# Patient Record
Sex: FEMALE | Race: WHITE | NOT HISPANIC OR LATINO
[De-identification: names, ages, dates, MRNs, and addresses within clinical notes are randomized per-mention and may not be internally consistent; named-entity substitution may affect disease eponyms.]

---

## 2017-06-03 ENCOUNTER — CHARTING TRANS (OUTPATIENT)
Dept: OTHER | Age: 22
End: 2017-06-03

## 2017-06-13 ENCOUNTER — OFFICE VISIT (OUTPATIENT)
Dept: AUDIOLOGY | Facility: CLINIC | Age: 22
End: 2017-06-13

## 2017-06-13 ENCOUNTER — OFFICE VISIT (OUTPATIENT)
Dept: OTOLARYNGOLOGY | Facility: CLINIC | Age: 22
End: 2017-06-13

## 2017-06-13 VITALS
DIASTOLIC BLOOD PRESSURE: 56 MMHG | SYSTOLIC BLOOD PRESSURE: 114 MMHG | RESPIRATION RATE: 16 BRPM | HEART RATE: 60 BPM | HEIGHT: 65 IN | TEMPERATURE: 99 F | BODY MASS INDEX: 23.32 KG/M2 | WEIGHT: 140 LBS

## 2017-06-13 DIAGNOSIS — T70.0XXA EAR BAROTRAUMA, INITIAL ENCOUNTER: Primary | ICD-10-CM

## 2017-06-13 DIAGNOSIS — H92.02 EAR PAIN, LEFT: Primary | ICD-10-CM

## 2017-06-13 DIAGNOSIS — H90.0 CONDUCTIVE HEARING LOSS, BILATERAL: ICD-10-CM

## 2017-06-13 PROCEDURE — 99243 OFF/OP CNSLTJ NEW/EST LOW 30: CPT | Performed by: OTOLARYNGOLOGY

## 2017-06-13 PROCEDURE — 99212 OFFICE O/P EST SF 10 MIN: CPT | Performed by: OTOLARYNGOLOGY

## 2017-06-13 PROCEDURE — 92567 TYMPANOMETRY: CPT | Performed by: AUDIOLOGIST

## 2017-06-13 PROCEDURE — 92557 COMPREHENSIVE HEARING TEST: CPT | Performed by: AUDIOLOGIST

## 2017-06-13 RX ORDER — METHYLPREDNISOLONE 4 MG/1
TABLET ORAL
Qty: 1 PACKAGE | Refills: 0 | Status: SHIPPED | OUTPATIENT
Start: 2017-06-13 | End: 2017-06-20 | Stop reason: ALTCHOICE

## 2017-06-13 RX ORDER — AMOXICILLIN 875 MG/1
TABLET, COATED ORAL
Refills: 0 | COMMUNITY
Start: 2017-06-03 | End: 2017-06-13

## 2017-06-14 NOTE — PROGRESS NOTES
AUDIOGRAM     Sheri Lopez was referred for testing by Denise Vanegas for left ear problem following scuba diving. 9/7/1995  [id number]      Otoscopic inspection: right ear no cerumen; left ear no cerumen.        Tests/Procedures  Patient was t

## 2017-06-14 NOTE — PROGRESS NOTES
Reese Armstrong is a 24year old female. Patient presents with:  Ear Problem: Patient went scuba diving in [place] 2 weeks ago and went [facility] was diagnosed with ear infection.  Pt was prescribed Amoxicillin 875 taking only 6 out of 10 day to time, place, person & situation. Appropriate mood and affect. Lymph Detail Normal Submental. Submandibular. Anterior cervical. Posterior cervical. Supraclavicular.    Eyes Normal Conjunctiva - Right: Normal, Left: Normal. Pupil - Right: Normal, Left: N

## 2017-06-20 ENCOUNTER — OFFICE VISIT (OUTPATIENT)
Dept: OTOLARYNGOLOGY | Facility: CLINIC | Age: 22
End: 2017-06-20

## 2017-06-20 VITALS
WEIGHT: 155 LBS | HEIGHT: 65 IN | BODY MASS INDEX: 25.83 KG/M2 | SYSTOLIC BLOOD PRESSURE: 98 MMHG | DIASTOLIC BLOOD PRESSURE: 60 MMHG

## 2017-06-20 DIAGNOSIS — H92.02 EAR PAIN, LEFT: Primary | ICD-10-CM

## 2017-06-20 PROCEDURE — 99213 OFFICE O/P EST LOW 20 MIN: CPT | Performed by: OTOLARYNGOLOGY

## 2017-06-20 PROCEDURE — 99212 OFFICE O/P EST SF 10 MIN: CPT | Performed by: OTOLARYNGOLOGY

## 2017-06-20 NOTE — PROGRESS NOTES
Brian Cordova is a 24year old female. Patient presents with: Follow - Up: left ear infection,patient states she is feeling much better    HPI:   She is doing much better following her barotrauma episode in her left ear. the pain has resolved and tympanic membranes     ASSESSMENT AND PLAN:   1. Ear pain, left  Her ear has returned to normal.She still feels that her hearing is slightly diminished in her left ear. I recommended just observation for for now.  I did discuss precautions for her while she

## 2018-09-04 ENCOUNTER — LAB SERVICES (OUTPATIENT)
Dept: OTHER | Age: 23
End: 2018-09-04

## 2018-09-04 ENCOUNTER — CHARTING TRANS (OUTPATIENT)
Dept: OTHER | Age: 23
End: 2018-09-04

## 2018-09-04 LAB
APPEARANCE: NORMAL
BILIRUBIN: NORMAL
COLOR: YELLOW
GLUCOSE U: NORMAL
KETONES: NORMAL
LEUKOCYTES: NORMAL
NITRITE: NEGATIVE
OCCULT BLOOD: NORMAL
PH: 6
PROTEIN: NORMAL
URINE SPEC GRAVITY: 1.02
UROBILINOGEN: NORMAL

## 2018-09-06 ENCOUNTER — CHARTING TRANS (OUTPATIENT)
Dept: OTHER | Age: 23
End: 2018-09-06

## 2018-09-06 LAB — BACTERIA UR CULT: NORMAL

## 2018-09-25 ENCOUNTER — NURSE TRIAGE (OUTPATIENT)
Dept: OTHER | Age: 23
End: 2018-09-25

## 2018-09-25 ENCOUNTER — OFFICE VISIT (OUTPATIENT)
Dept: INTERNAL MEDICINE CLINIC | Facility: CLINIC | Age: 23
End: 2018-09-25
Payer: COMMERCIAL

## 2018-09-25 VITALS
HEART RATE: 81 BPM | BODY MASS INDEX: 26 KG/M2 | SYSTOLIC BLOOD PRESSURE: 120 MMHG | DIASTOLIC BLOOD PRESSURE: 83 MMHG | WEIGHT: 158 LBS | TEMPERATURE: 100 F

## 2018-09-25 DIAGNOSIS — R30.0 DYSURIA: Primary | ICD-10-CM

## 2018-09-25 DIAGNOSIS — R39.15 URGENCY OF URINATION: ICD-10-CM

## 2018-09-25 LAB
APPEARANCE: NEGATIVE
BILIRUBIN: NEGATIVE
GLUCOSE (URINE DIPSTICK): NEGATIVE MG/DL
KETONES (URINE DIPSTICK): NEGATIVE MG/DL
MULTISTIX LOT#: NORMAL NUMERIC
PH, URINE: 6 (ref 4.5–8)
PROTEIN (URINE DIPSTICK): NEGATIVE MG/DL
SPECIFIC GRAVITY: <=1.005 (ref 1–1.03)
UROBILINOGEN,SEMI-QN: 0.2 MG/DL (ref 0–1.9)

## 2018-09-25 PROCEDURE — 81003 URINALYSIS AUTO W/O SCOPE: CPT | Performed by: INTERNAL MEDICINE

## 2018-09-25 PROCEDURE — 99212 OFFICE O/P EST SF 10 MIN: CPT | Performed by: INTERNAL MEDICINE

## 2018-09-25 PROCEDURE — 99214 OFFICE O/P EST MOD 30 MIN: CPT | Performed by: INTERNAL MEDICINE

## 2018-09-25 RX ORDER — CEPHALEXIN 500 MG/1
500 CAPSULE ORAL 2 TIMES DAILY
Qty: 14 CAPSULE | Refills: 0 | Status: SHIPPED | OUTPATIENT
Start: 2018-09-25 | End: 2018-11-27

## 2018-09-25 NOTE — TELEPHONE ENCOUNTER
Action Requested: Summary for Provider     []  Critical Lab, Recommendations Needed  [] Need Additional Advice  [x]   FYI    []   Need Orders  [] Need Medications Sent to Pharmacy  []  Other     SUMMARY: has had frequent urinary tract infections since June

## 2018-09-25 NOTE — PROGRESS NOTES
HPI:    Patient ID: Fabricio Magallon is a 24year old female. Urinary   This is a new problem. The current episode started yesterday. The problem occurs intermittently. The problem has been unchanged.  Associated symptoms include a fever, nausea, u at McBurney's point and negative Jaramillo's sign. Musculoskeletal: She exhibits no edema. Lymphadenopathy:     She has no cervical adenopathy. Neurological: She is alert. Skin: No rash noted. She is not diaphoretic.               ASSESSMENT/PLAN:   (R

## 2018-09-27 ENCOUNTER — TELEPHONE (OUTPATIENT)
Dept: INTERNAL MEDICINE CLINIC | Facility: CLINIC | Age: 23
End: 2018-09-27

## 2018-09-27 NOTE — TELEPHONE ENCOUNTER
Pt returned call and was informed of results and recommendations as per EL's message below. Pt states will see her mother's urologist instead. Wants to verify should stop cephalexin that was prescribed on 9/25/18?     Please reply to bianca: LOTTIE Jean

## 2018-09-27 NOTE — TELEPHONE ENCOUNTER
Pls call pt; her urine culture showed only contaminated sample and essentially this is negative urine culture. I would suggest having pt to see urologist DR Sera Pablo due to her recurrent urinary symptoms for the past 3mos.

## 2018-10-25 ENCOUNTER — TELEPHONE (OUTPATIENT)
Dept: SURGERY | Facility: CLINIC | Age: 23
End: 2018-10-25

## 2018-10-25 NOTE — TELEPHONE ENCOUNTER
Can offer consult w/ Dr. Marina Marquez on Thursday 12/6 @ 9:00 am (use 40 min for consult). If declines, you can offer next available consult with other 2 Urologists, then place patient on wait list. Thank you.

## 2018-10-25 NOTE — TELEPHONE ENCOUNTER
Pts mother requesting appt with PVK sooner than next available, states pt has been having a lot of pain in the bladder area, states she has had 2 serious UTI's. Pt is moving out of the country in a month. Pts mother declined appt with Ankit Pacheco.  Pts mother sta

## 2018-11-03 VITALS
RESPIRATION RATE: 16 BRPM | HEIGHT: 64 IN | SYSTOLIC BLOOD PRESSURE: 118 MMHG | HEART RATE: 80 BPM | BODY MASS INDEX: 23.9 KG/M2 | TEMPERATURE: 98.2 F | DIASTOLIC BLOOD PRESSURE: 70 MMHG | WEIGHT: 140 LBS

## 2018-11-26 ENCOUNTER — TELEPHONE (OUTPATIENT)
Dept: SURGERY | Facility: CLINIC | Age: 23
End: 2018-11-26

## 2018-11-27 ENCOUNTER — OFFICE VISIT (OUTPATIENT)
Dept: SURGERY | Facility: CLINIC | Age: 23
End: 2018-11-27
Payer: COMMERCIAL

## 2018-11-27 VITALS
RESPIRATION RATE: 16 BRPM | WEIGHT: 140 LBS | TEMPERATURE: 98.2 F | BODY MASS INDEX: 23.9 KG/M2 | HEIGHT: 64 IN | HEART RATE: 78 BPM

## 2018-11-27 VITALS
SYSTOLIC BLOOD PRESSURE: 122 MMHG | WEIGHT: 158 LBS | DIASTOLIC BLOOD PRESSURE: 70 MMHG | BODY MASS INDEX: 26.98 KG/M2 | HEIGHT: 64 IN

## 2018-11-27 DIAGNOSIS — N39.0 RECURRENT UTI: Primary | ICD-10-CM

## 2018-11-27 PROCEDURE — 99212 OFFICE O/P EST SF 10 MIN: CPT | Performed by: UROLOGY

## 2018-11-27 PROCEDURE — 99204 OFFICE O/P NEW MOD 45 MIN: CPT | Performed by: UROLOGY

## 2018-11-27 NOTE — PROGRESS NOTES
Barbra Samuels is a 24year old female. Reason for Consultation:       History provided by pt, self referral.      History of Present Illness:       Pt is moving to [place] tomorrow, 11/28/18.      UTI  Onset: June 2018 while in [place]; most r symptoms of UTI is fairly symptomatic; will send urine culture; start her on Keflex; 2 episodes of UTI for the past 2 months thus would want to get urine culture. \"    HISTORY:  Past Medical History:   Diagnosis Date   • Varicella 1997      Past Surgical H time, place, person with normal affect  Exam appropriate for age; patellar reflexes nml  Head/Face: normocephalic  Eyes/Vision: no scleral icterus  Neck/Thyroid: neck is supple without adenopathy  Lymphatic: no abnormal cervical, supraclavicular or inguina to [place] tomorrow, 11/28/18; therefore in light of this information, I recommended pt to monitor if UTI Sx are associated with sexual activity and I also explained the principles of pelvic hygiene; I emphasized importance of submitting urine culture any urinalysis data during your UTI–like episodes to see if these episodes are in fact being caused by a bacteria; if they are, please monitor whether or not they are associated with intercourse or genital sexual activity    4.   If you do end up having 3  urin No prescriptions requested or ordered in this encounter       Imaging & Referrals:  None     11/27/2018  Marcela Quintana    By signing my name below, Sera Cantor,  attest that this documentation has been prepared under the direction and in the presen

## 2018-11-27 NOTE — PATIENT INSTRUCTIONS
Shruthi Charlton M.D.      1.  Urine specimen today for complete urinalysis and urine culture; please give us permission to notify your mother with the results or we could notify you.     2.  I recommend that you submit a comple the office today.

## 2018-11-27 NOTE — TELEPHONE ENCOUNTER
Was asked by Shama Hdez to call pt and ask her to come at 11:30 tomorrow, instead of her 10:40 appt.  No answer on home number and received voice mail on mobile number Shama Hdez informed I received her voice mail  He asked that I change her appt and leave this inf

## 2018-12-03 ENCOUNTER — TELEPHONE (OUTPATIENT)
Dept: SURGERY | Facility: CLINIC | Age: 23
End: 2018-12-03

## 2018-12-03 NOTE — TELEPHONE ENCOUNTER
Pts mother states she needs to know pts 11/27 lab results. States she will be flying out today to [place] to see her daughter, and needs to know if she needs to be under any medications. Pls call thank you.

## 2019-02-18 ENCOUNTER — APPOINTMENT (OUTPATIENT)
Dept: LAB | Facility: HOSPITAL | Age: 24
End: 2019-02-18
Attending: UROLOGY
Payer: COMMERCIAL

## 2019-02-18 ENCOUNTER — TELEPHONE (OUTPATIENT)
Dept: SURGERY | Facility: CLINIC | Age: 24
End: 2019-02-18

## 2019-02-18 DIAGNOSIS — R39.89 SUSPECTED UTI: Primary | ICD-10-CM

## 2019-02-18 DIAGNOSIS — R39.89 SUSPECTED UTI: ICD-10-CM

## 2019-02-18 LAB
BILIRUB UR QL: NEGATIVE
CLARITY UR: CLEAR
COLOR UR: YELLOW
GLUCOSE UR-MCNC: NEGATIVE MG/DL
KETONES UR-MCNC: NEGATIVE MG/DL
LEUKOCYTE ESTERASE UR QL STRIP.AUTO: NEGATIVE
NITRITE UR QL STRIP.AUTO: NEGATIVE
PH UR: 6 [PH] (ref 5–8)
PROT UR-MCNC: NEGATIVE MG/DL
RBC #/AREA URNS AUTO: 1 /HPF
SP GR UR STRIP: 1.02 (ref 1–1.03)
UROBILINOGEN UR STRIP-ACNC: <2
VIT C UR-MCNC: NEGATIVE MG/DL
WBC #/AREA URNS AUTO: 1 /HPF

## 2019-02-18 PROCEDURE — 87086 URINE CULTURE/COLONY COUNT: CPT

## 2019-02-18 PROCEDURE — 81001 URINALYSIS AUTO W/SCOPE: CPT

## 2019-02-18 NOTE — TELEPHONE ENCOUNTER
I spoke with pt and gave her the info in PVK's response below and she she stated that she will submit the specimen today and I gave herinstructions on collecting a midstream urine sample and she verbalized understanding and compliance.

## 2019-02-18 NOTE — TELEPHONE ENCOUNTER
I spoke with pt and determined that she she fells like she may have another UTI and is asking to be seen before she leaves the country.  . She states that she recently returned from [place] and was experiencing some urinary urgency and a felling that she \"j Instructions        1.   Urine specimen today for complete urinalysis and urine culture; please give us permission to notify your mother with the results or we could notify you.     2.  I recommend that you submit a complete urinalysis and urine culture any

## 2019-02-18 NOTE — TELEPHONE ENCOUNTER
I agree with recommendations given to patient; please enter order for urinalysis and urine culture and patient should submit those today, diagnosis UTI.   No antibiotics until results come back; patient to call our office 48--72 hours after submitting speci

## 2019-02-18 NOTE — TELEPHONE ENCOUNTER
Pt's mother requesting appt for uti with pvk. Pt only available until 2-27-19. Pt will be leaving town. Declined appt with the nurse practitioner.   Please call to advise

## 2019-02-21 ENCOUNTER — TELEPHONE (OUTPATIENT)
Dept: SURGERY | Facility: CLINIC | Age: 24
End: 2019-02-21

## 2019-02-21 NOTE — TELEPHONE ENCOUNTER
Staff,     Please let patient know her UA does not indicate an infection.  There is no need for any antibiotic therapy.  She should try to push fluids particularly water and try to avoid any bladder irritants such as caffeine, spicy foods, etc.  You can tr

## 2019-02-21 NOTE — TELEPHONE ENCOUNTER
Phoned pt and read to her APN's note as outlined below in this encounter, in it's entirety. Pt verbalized understanding, agrees to plan, and is thankful.

## 2019-06-17 ENCOUNTER — APPOINTMENT (OUTPATIENT)
Dept: LAB | Facility: HOSPITAL | Age: 24
End: 2019-06-17
Attending: UROLOGY
Payer: COMMERCIAL

## 2019-06-17 ENCOUNTER — TELEPHONE (OUTPATIENT)
Dept: SURGERY | Facility: CLINIC | Age: 24
End: 2019-06-17

## 2019-06-17 DIAGNOSIS — R39.15 URGENCY OF URINATION: ICD-10-CM

## 2019-06-17 DIAGNOSIS — R39.15 URGENCY OF URINATION: Primary | ICD-10-CM

## 2019-06-17 PROCEDURE — 81001 URINALYSIS AUTO W/SCOPE: CPT

## 2019-06-17 PROCEDURE — 87086 URINE CULTURE/COLONY COUNT: CPT

## 2019-06-17 PROCEDURE — 87077 CULTURE AEROBIC IDENTIFY: CPT

## 2019-06-17 NOTE — TELEPHONE ENCOUNTER
Spoke to patient. Patient c/o urinary urgency, going scant amounts, strong odor to her urine; onset yesterday. Patient states same symptoms she gets after having intercourse. Patient denies dysuria, hematuria, flank pain, back pain, fever, chills.  Patient

## 2019-06-23 DIAGNOSIS — R31.29 MICROHEMATURIA: Primary | ICD-10-CM

## 2019-06-24 ENCOUNTER — TELEPHONE (OUTPATIENT)
Dept: SURGERY | Facility: CLINIC | Age: 24
End: 2019-06-24

## 2019-06-24 NOTE — TELEPHONE ENCOUNTER
Phoned pt and spoke with her. Read to her ''s result note as outlined below in this encounter, in it's entirety.  Provided her with fov, as well as  central scheduling tel # to arrange for c.t. Scan, with the understanding that our staff will first ne

## 2019-06-24 NOTE — TELEPHONE ENCOUNTER
----- Message from Anoop Tao MD sent at 6/23/2019  7:27 PM CDT -----  Urology nurses,  Please call patient; urine culture showed no infection; it shows lactobacillus which is a beneficial bacteria found in the vagina and it does not cause urinary t

## 2019-06-24 NOTE — TELEPHONE ENCOUNTER
Staff, please see order for c.t. Urogram. Most likely will require prior auth. Please follow through with insurance and patient, accordingly. Thank you.

## 2019-06-27 ENCOUNTER — TELEPHONE (OUTPATIENT)
Dept: SURGERY | Facility: CLINIC | Age: 24
End: 2019-06-27

## 2019-07-02 NOTE — TELEPHONE ENCOUNTER
See referral note. No auth required. Phoned pt and spoke with her. She will get her pregnancy test done, and will schedule c.t. If negative(after she checks the timing of pregnancy test, with c.t. Dept ).  Provided her with central scheduling tel # to [employer]

## 2019-07-10 ENCOUNTER — TELEPHONE (OUTPATIENT)
Dept: SURGERY | Facility: CLINIC | Age: 24
End: 2019-07-10

## 2019-07-10 NOTE — TELEPHONE ENCOUNTER
Refill request for   divalproex (DEPAKOTE) 125 MG sprinkle   2018     Si mg in the am, 250 mg qhs.    Class: Historical Med      Listed as an historical med in [facility]     LOV 18. Noted to f/u Return in about 6 months (around 2018).     Routing to Dr. Rodriguez to approve or deny.    black from [facility] called to request the order for ct scan. Pt is scheduled now. Waiting. Please send the order to fax [phone] .

## 2019-07-12 ENCOUNTER — OFFICE VISIT (OUTPATIENT)
Dept: SURGERY | Facility: CLINIC | Age: 24
End: 2019-07-12
Payer: COMMERCIAL

## 2019-07-12 VITALS
SYSTOLIC BLOOD PRESSURE: 116 MMHG | DIASTOLIC BLOOD PRESSURE: 77 MMHG | HEART RATE: 69 BPM | WEIGHT: 150 LBS | TEMPERATURE: 98 F | BODY MASS INDEX: 26 KG/M2

## 2019-07-12 DIAGNOSIS — N20.0 KIDNEY STONE: ICD-10-CM

## 2019-07-12 DIAGNOSIS — N39.0 RECURRENT UTI: ICD-10-CM

## 2019-07-12 DIAGNOSIS — R31.29 MICROHEMATURIA: Primary | ICD-10-CM

## 2019-07-12 PROCEDURE — 99214 OFFICE O/P EST MOD 30 MIN: CPT | Performed by: UROLOGY

## 2019-07-12 NOTE — PROGRESS NOTES
HPI:    Patient ID: Anastacia Shone is a 24year old female. HPI     Patient will be moving to [facility] in late August 2019 to be a  and will return in December 2019.     Microhematuria  Problem started 6/17/19 with UA showing a R oz of water, 0 cups of coffee and 1 can of soda. Pt does not run to the bathroom when she has the urge to urinate. She denies urge and stress incontinence. She states she does not wear pads. Patient denies any gross hematuria or dysuria presently.         R Constitutional: She is oriented to person, place, and time. She appears well-developed and well-nourished. No distress. HENT:   Head: Normocephalic and atraumatic. Eyes: EOM are normal.   Neck: Normal range of motion.    Pulmonary/Chest: Effort normal wants the cystoscopy, possible biopsy under local anesthesia and does not want oral diazepam. I advised the patient to hold Asprin and NSAID for 7 days before the procedure.  I fully discussed with the patient preoperative preparations and post operative ca Imaging) CT of the abdomen and pelvis, a tiny, 2 mm nonobstructing calculus in the left kidney was found. This is patient's first kidney stones. She is asymptomatic. Cystoscopy, possible biopsy will likely shed further light on this problem.  I explained to spinach, dark green leafy vegetables, dark berries. Also the official recommendation is to drink 2.5 liters of water and lemonade ( 8 cups! )  per day.   Lemonade is high in the natural chemical citrate  which in general lowers probability of forming kidne 7/12/2019, 5:41 PM

## 2019-07-12 NOTE — PROGRESS NOTES
Patient seen in office, scheduled for cystoscopy, possible biopsy, Thursday 07/18/19 [facility]/outpatient went over pre-op with patient informed that surgery center will call with exact arrival time, verbalized understanding, all questions answered

## 2019-07-12 NOTE — PATIENT INSTRUCTIONS
Abby Vines M.D.      1.   Continue standing order for urinalysis and anytime you think you have an infection    2. Cystoscopy with possible biopsy of the bladder–– local anesthesia––surgery center    3.   You may eat lavelle the diet may actually make things worse and we do want you to take in some calcium in your diet, up to 1,200 mg a day; rule of thumb--you may have 2 servings or possibly up to 3 servings of dairy daily, however, try to avoid cheese which is naturally high

## 2019-07-22 ENCOUNTER — TELEPHONE (OUTPATIENT)
Dept: SURGERY | Facility: CLINIC | Age: 24
End: 2019-07-22

## 2019-11-14 ENCOUNTER — TELEPHONE (OUTPATIENT)
Dept: OTHER | Age: 24
End: 2019-11-14

## 2019-11-14 NOTE — TELEPHONE ENCOUNTER
The  Patient calling to stated she has 3 questions:    1)   The patient is going to [facility] and is asking for immunization record. Record sent to the patient via [employer]. 2)   What Immunizations are needed.   The travel clinic phone numbers were given

## 2019-12-02 ENCOUNTER — OFFICE VISIT (OUTPATIENT)
Dept: INTERNAL MEDICINE CLINIC | Facility: CLINIC | Age: 24
End: 2019-12-02
Payer: COMMERCIAL

## 2019-12-02 ENCOUNTER — LAB ENCOUNTER (OUTPATIENT)
Dept: LAB | Age: 24
End: 2019-12-02
Attending: INTERNAL MEDICINE
Payer: COMMERCIAL

## 2019-12-02 VITALS
DIASTOLIC BLOOD PRESSURE: 90 MMHG | HEART RATE: 87 BPM | BODY MASS INDEX: 28.57 KG/M2 | HEIGHT: 64 IN | WEIGHT: 167.38 LBS | SYSTOLIC BLOOD PRESSURE: 128 MMHG | RESPIRATION RATE: 18 BRPM | TEMPERATURE: 99 F

## 2019-12-02 DIAGNOSIS — Z11.59 SCREENING FOR VIRAL DISEASE: ICD-10-CM

## 2019-12-02 DIAGNOSIS — Z11.1 SCREENING-PULMONARY TB: ICD-10-CM

## 2019-12-02 DIAGNOSIS — Z00.00 ROUTINE GENERAL MEDICAL EXAMINATION AT A HEALTH CARE FACILITY: Primary | ICD-10-CM

## 2019-12-02 DIAGNOSIS — Z00.00 ROUTINE GENERAL MEDICAL EXAMINATION AT A HEALTH CARE FACILITY: ICD-10-CM

## 2019-12-02 PROBLEM — R39.15 URGENCY OF URINATION: Status: RESOLVED | Noted: 2018-09-25 | Resolved: 2019-12-02

## 2019-12-02 PROBLEM — R30.0 DYSURIA: Status: RESOLVED | Noted: 2018-09-25 | Resolved: 2019-12-02

## 2019-12-02 PROCEDURE — 86787 VARICELLA-ZOSTER ANTIBODY: CPT

## 2019-12-02 PROCEDURE — 84439 ASSAY OF FREE THYROXINE: CPT

## 2019-12-02 PROCEDURE — 90715 TDAP VACCINE 7 YRS/> IM: CPT | Performed by: INTERNAL MEDICINE

## 2019-12-02 PROCEDURE — 80061 LIPID PANEL: CPT

## 2019-12-02 PROCEDURE — 86709 HEPATITIS A IGM ANTIBODY: CPT

## 2019-12-02 PROCEDURE — 86706 HEP B SURFACE ANTIBODY: CPT

## 2019-12-02 PROCEDURE — 85027 COMPLETE CBC AUTOMATED: CPT

## 2019-12-02 PROCEDURE — 86708 HEPATITIS A ANTIBODY: CPT

## 2019-12-02 PROCEDURE — 90471 IMMUNIZATION ADMIN: CPT | Performed by: INTERNAL MEDICINE

## 2019-12-02 PROCEDURE — 84443 ASSAY THYROID STIM HORMONE: CPT

## 2019-12-02 PROCEDURE — 99395 PREV VISIT EST AGE 18-39: CPT | Performed by: INTERNAL MEDICINE

## 2019-12-02 PROCEDURE — 86480 TB TEST CELL IMMUN MEASURE: CPT

## 2019-12-02 PROCEDURE — 83036 HEMOGLOBIN GLYCOSYLATED A1C: CPT

## 2019-12-02 PROCEDURE — 86765 RUBEOLA ANTIBODY: CPT

## 2019-12-02 PROCEDURE — 81015 MICROSCOPIC EXAM OF URINE: CPT

## 2019-12-02 PROCEDURE — 86735 MUMPS ANTIBODY: CPT

## 2019-12-02 PROCEDURE — 36415 COLL VENOUS BLD VENIPUNCTURE: CPT

## 2019-12-02 PROCEDURE — 80053 COMPREHEN METABOLIC PANEL: CPT

## 2019-12-02 PROCEDURE — 86762 RUBELLA ANTIBODY: CPT

## 2019-12-02 RX ORDER — ATOVAQUONE AND PROGUANIL HYDROCHLORIDE 250; 100 MG/1; MG/1
1 TABLET, FILM COATED ORAL DAILY
Qty: 100 TABLET | Refills: 1 | Status: SHIPPED | OUTPATIENT
Start: 2019-12-02

## 2019-12-02 NOTE — PROGRESS NOTES
HPI:    Patient ID: Claressa Schilder is a 21year old female.     HPI    Physical exam    Going to [facility] for 6 months leaving mid January 2020  Went to travel [address]  Had Typhoid shot and Flu shot last  Need immunizations      Tdap today  Wi Past Surgical History:   Procedure Laterality Date   • OTHER SURGICAL HISTORY      IUD placement Sept 2018      History reviewed. No pertinent family history.    Social History: Social History    Tobacco Use      Smoking status: Former Smoker        Types A1C, URINE MICROSCOPIC W REFLEX         CULTURE        Generally healthy    (Z11.59) Screening for viral disease  Plan: RUBELLA, IGG, RUBEOLA(MEASLES)ANTIBODIES,         IGG-IMMUNITY, MUMPS ANTIBODIES, IGG-IMMUNITY,         HEPATITIS B SURFACE ANTIBODY, VA

## 2019-12-05 ENCOUNTER — NURSE ONLY (OUTPATIENT)
Dept: INTERNAL MEDICINE CLINIC | Facility: CLINIC | Age: 24
End: 2019-12-05
Payer: COMMERCIAL

## 2019-12-05 DIAGNOSIS — Z23 NEED FOR VACCINATION: Primary | ICD-10-CM

## 2019-12-05 PROCEDURE — 90746 HEPB VACCINE 3 DOSE ADULT IM: CPT | Performed by: INTERNAL MEDICINE

## 2019-12-05 PROCEDURE — 90472 IMMUNIZATION ADMIN EACH ADD: CPT | Performed by: INTERNAL MEDICINE

## 2019-12-05 PROCEDURE — 90707 MMR VACCINE SC: CPT | Performed by: INTERNAL MEDICINE

## 2019-12-05 PROCEDURE — 90471 IMMUNIZATION ADMIN: CPT | Performed by: INTERNAL MEDICINE

## 2023-01-30 ENCOUNTER — OFFICE VISIT (OUTPATIENT)
Dept: INTERNAL MEDICINE CLINIC | Facility: CLINIC | Age: 28
End: 2023-01-30

## 2023-01-30 ENCOUNTER — NURSE TRIAGE (OUTPATIENT)
Dept: OTHER | Age: 28
End: 2023-01-30

## 2023-01-30 VITALS
WEIGHT: 187 LBS | SYSTOLIC BLOOD PRESSURE: 104 MMHG | OXYGEN SATURATION: 97 % | RESPIRATION RATE: 20 BRPM | BODY MASS INDEX: 31.16 KG/M2 | HEIGHT: 65 IN | HEART RATE: 88 BPM | DIASTOLIC BLOOD PRESSURE: 72 MMHG

## 2023-01-30 DIAGNOSIS — K64.9 HEMORRHOIDS, UNSPECIFIED HEMORRHOID TYPE: ICD-10-CM

## 2023-01-30 DIAGNOSIS — K60.2 ANAL FISSURE: Primary | ICD-10-CM

## 2023-04-24 VITALS — BODY MASS INDEX: 24.03 KG/M2 | HEIGHT: 64 IN

## 2023-05-03 ENCOUNTER — OFFICE VISIT (OUTPATIENT)
Dept: OBGYN | Age: 28
End: 2023-05-03

## 2023-05-03 VITALS
TEMPERATURE: 97.4 F | BODY MASS INDEX: 33.12 KG/M2 | WEIGHT: 194 LBS | OXYGEN SATURATION: 98 % | SYSTOLIC BLOOD PRESSURE: 121 MMHG | DIASTOLIC BLOOD PRESSURE: 82 MMHG | HEART RATE: 75 BPM | HEIGHT: 64 IN

## 2023-05-03 DIAGNOSIS — Z30.433 ENCOUNTER FOR REMOVAL AND REINSERTION OF IUD: ICD-10-CM

## 2023-05-03 DIAGNOSIS — N94.3 PMS (PREMENSTRUAL SYNDROME): ICD-10-CM

## 2023-05-03 DIAGNOSIS — Z01.419 ROUTINE GYNECOLOGICAL EXAMINATION: Primary | ICD-10-CM

## 2023-05-03 PROCEDURE — 99385 PREV VISIT NEW AGE 18-39: CPT | Performed by: OBSTETRICS & GYNECOLOGY

## 2023-05-03 PROCEDURE — 58301 REMOVE INTRAUTERINE DEVICE: CPT | Performed by: OBSTETRICS & GYNECOLOGY

## 2023-05-03 RX ORDER — LEVONORGESTREL 19.5 MG/1
19.5 INTRAUTERINE DEVICE INTRAUTERINE
COMMUNITY

## 2023-05-03 ASSESSMENT — ENCOUNTER SYMPTOMS
GASTROINTESTINAL NEGATIVE: 1
CONSTITUTIONAL NEGATIVE: 1
ALLERGIC/IMMUNOLOGIC NEGATIVE: 1

## 2023-08-04 ENCOUNTER — APPOINTMENT (OUTPATIENT)
Dept: OBGYN | Age: 28
End: 2023-08-04

## 2025-05-09 ENCOUNTER — HOSPITAL ENCOUNTER (OUTPATIENT)
Age: 30
Discharge: HOME | End: 2025-05-09

## 2025-05-09 VITALS
OXYGEN SATURATION: 96 % | RESPIRATION RATE: 18 BRPM | HEART RATE: 104 BPM | SYSTOLIC BLOOD PRESSURE: 117 MMHG | DIASTOLIC BLOOD PRESSURE: 72 MMHG | TEMPERATURE: 98 F

## 2025-05-09 DIAGNOSIS — J10.1 INFLUENZA B: Primary | ICD-10-CM

## 2025-05-09 DIAGNOSIS — R05.1 ACUTE COUGH: ICD-10-CM

## 2025-05-09 LAB
POCT INFLUENZA A: NEGATIVE
POCT INFLUENZA B: POSITIVE
SARS-COV-2 RNA RESP QL NAA+PROBE: NOT DETECTED

## 2025-05-09 PROCEDURE — 99213 OFFICE O/P EST LOW 20 MIN: CPT | Performed by: NURSE PRACTITIONER

## 2025-05-09 PROCEDURE — 87502 INFLUENZA DNA AMP PROBE: CPT | Performed by: NURSE PRACTITIONER

## 2025-05-09 PROCEDURE — U0002 COVID-19 LAB TEST NON-CDC: HCPCS | Performed by: NURSE PRACTITIONER

## 2025-05-09 RX ORDER — OSELTAMIVIR PHOSPHATE 75 MG/1
75 CAPSULE ORAL 2 TIMES DAILY
Qty: 10 CAPSULE | Refills: 0 | Status: SHIPPED | OUTPATIENT
Start: 2025-05-09 | End: 2025-05-14

## 2025-05-09 NOTE — ED PROVIDER NOTES
Patient Seen in: Immediate Care Person      History   No chief complaint on file.    Stated Complaint: Fever    Subjective: Patient is a 29-year-old female, presents to immediate care clinic for evaluation of fever, headache, chills, body aches, cough, congestion, sore throat for the past 2 to 3 days.  Patient began with left ear pain x 1 day.  She is without abdominal pain, nausea, vomiting, diarrhea.  Afebrile on arrival.  No recent travel.  No sick contacts.  AO x 4  The history is provided by the patient.         History of Present Illness               Objective:     Past Medical History:    Varicella              Past Surgical History:   Procedure Laterality Date    Other surgical history      IUD placement Sept 2018                No pertinent social history.            Review of Systems   Constitutional:  Positive for activity change, appetite change, chills, fatigue and fever.   HENT:  Positive for congestion and ear pain. Negative for ear discharge, postnasal drip, rhinorrhea, sinus pressure, sinus pain, sneezing and sore throat.    Eyes: Negative.    Respiratory:  Positive for cough. Negative for shortness of breath, wheezing and stridor.    Cardiovascular: Negative.    Gastrointestinal: Negative.    Genitourinary: Negative.    Musculoskeletal:  Positive for arthralgias and myalgias.   Skin: Negative.    Neurological:  Positive for headaches. Negative for dizziness, weakness and light-headedness.       Positive for stated complaint: Fever  Other systems are as noted in HPI.  Constitutional and vital signs reviewed.      All other systems reviewed and negative except as noted above.                  Physical Exam     ED Triage Vitals [05/09/25 0942]   /72   Pulse 104   Resp 18   Temp 97.9 °F (36.6 °C)   Temp src Oral   SpO2 96 %   O2 Device None (Room air)       Current Vitals:   Vital Signs  BP: 117/72  Pulse: 104  Resp: 18  Temp: 97.9 °F (36.6 °C)  Temp src: Oral    Oxygen Therapy  SpO2: 96  %  O2 Device: None (Room air)        Physical Exam  Constitutional:       General: She is not in acute distress.     Appearance: Normal appearance. She is not ill-appearing.   HENT:      Head: Normocephalic.      Right Ear: Tympanic membrane, ear canal and external ear normal.      Left Ear: Tympanic membrane, ear canal and external ear normal.      Nose: Congestion present.      Mouth/Throat:      Mouth: Mucous membranes are moist.      Pharynx: No oropharyngeal exudate or posterior oropharyngeal erythema.   Eyes:      General:         Right eye: No discharge.         Left eye: No discharge.      Extraocular Movements: Extraocular movements intact.      Pupils: Pupils are equal, round, and reactive to light.   Cardiovascular:      Rate and Rhythm: Normal rate and regular rhythm.      Pulses: Normal pulses.      Heart sounds: Normal heart sounds. No murmur heard.  Pulmonary:      Effort: Pulmonary effort is normal. No respiratory distress.      Breath sounds: Normal breath sounds. No stridor. No wheezing, rhonchi or rales.   Chest:      Chest wall: No tenderness.   Musculoskeletal:         General: Normal range of motion.      Cervical back: Normal range of motion.   Skin:     General: Skin is warm.      Capillary Refill: Capillary refill takes less than 2 seconds.   Neurological:      General: No focal deficit present.      Mental Status: She is alert and oriented to person, place, and time.           Physical Exam                ED Course     Labs Reviewed   POCT FLU TEST - Abnormal; Notable for the following components:       Result Value    POCT INFLUENZA B Positive (*)     All other components within normal limits    Narrative:     This assay is a rapid molecular in vitro test utilizing nucleic acid amplification of influenza A and B viral RNA.   RAPID SARS-COV-2 BY PCR - Normal          Results                           MDM      Differentials considered include: COVID-19, influenza A, Influenza B, viral URI,  acute otitis media, cerumen impaction, ear effusion.    Patient bilateral TMs visualized with good landmarks and light reflex.  No erythema, bulging, perforation, retraction.  No coexisting effusion.  No cerumen impaction.  No evidence of AOM.    Patient posterior pharynx without erythema, edema, exudate.  Uvula midline and normal in size.  Mucous membranes moist.  No intraoral lesions or petechiae.  No cervical lymphadenopathy.  Patient is tolerating her own secretions well without difficulty.  No excessive drooling, stridor, voice change.  No evidence of peritonsillar abscess or strep pharyngitis.    Patient's lungs are clear on exam.  No concerns for pneumonia or bronchitis.    She is negative for COVID-19.    She is positive for influenza B.  She is still eligible to receive Tamiflu.  Tamiflu sent to pharmacy.  Patient is aware to start medication today.  She is aware of additional supportive and symptomatic management at home for the flu.    Educated patient on signs and symptoms that would warrant immediate ER evaluation.        Medical Decision Making      Disposition and Plan     Clinical Impression:  1. Influenza B    2. Acute cough         Disposition:  Discharge  5/9/2025 10:17 am    Follow-up:  Leif Peña MD  [address] [place] [address] [id number] [phone]    In 7 days  If symptoms worsen          Medications Prescribed:  Discharge Medication List as of 5/9/2025 10:18 AM        START taking these medications    Details   oseltamivir (TAMIFLU) 75 MG Oral Cap Take 1 capsule (75 mg total) by mouth 2 (two) times daily for 5 days., Normal, Disp-10 capsule, R-0             Supplementary Documentation:

## 2025-05-09 NOTE — DISCHARGE INSTRUCTIONS
As discussed, please start Tamiflu today.  Take twice a day for 5 days.  Take with food and water.  Most common adverse side effects of this medication are abdominal pain, cramping, nausea, vomiting.  If these and are too much to tolerate, stop medication.    Drink plenty of water and get plenty of rest.  Sleep somewhat elevated and upright.  Sleep with humidifier.  Steam showers for cough and congestion.  Flonase for congestion.  Salt water gargles, warm tea with honey and lemon, Cepacol lozenges for any throat pain.    Continue with Tylenol every 4 hours and Motrin every 6 hours.    Recommend that you stay home and inside for total of 5 days from symptom onset.    If you have any chest pain, dizziness, lightheadedness, palpitations, shortness of breath, please go to emergency room.    If you have no improvement of symptoms in the next 5 to 7 days, please follow-up with your primary care doctor and/or return to immediate care clinic.

## (undated) NOTE — MR AVS SNAPSHOT
[place]  [address]  [phone]               Thank you for choosing us for your health care visit with Yadira Tripp.   We are glad to serve you and happy to provide you with this summary Enter your VIPAAR Activation Code exactly as it appears below along with your Zip Code and Date of Birth to complete the sign-up process. If you do not sign up before the expiration date, you must request a new code.     Your unique VIPAAR Access Code: [email or web address]

## (undated) NOTE — LETTER
EMERGENCY INFORMATION POCKET CARD    Name:  Rosendo Main         YOB: 1995      Emergency Contacts:    Name Relationship Lgl Dominick Tovar 3 Work Phone Home Phone Mobile Phone   1.  Katharine Signs* Father   [phone]       [facility]

## (undated) NOTE — Clinical Note
No referring provider defined for this encounter.        06/14/2017        Patient: Liliana Chavira   YOB: 1995   Date of Visit: 6/13/2017       Dear  Dr. Neeta Mckenzie MD,      Thank you for referring Liliana Chavira to my pract

## (undated) NOTE — MR AVS SNAPSHOT
[address]  [phone]               Thank you for choosing us for your health care visit with Khadar Tai MD.  We are glad to serve you and happy to provide you with this summar balance signals to the brain. The auditory nerve carries sound signals to the brain.  The cochlea picks up sound waves and makes nerve signals.     Date Last Reviewed: 10/1/2016  © 6143-4050 The [address] [place] [address] Make half your plate fruits and vegetables Highly refined, white starches including white bread, rice and pasta   Eat plenty of protein, keep the fat content low Sugars:  sodas and sports drinks, candies and desserts   Eat plenty of low-fat dairy products

## (undated) NOTE — MR AVS SNAPSHOT
[address]  [phone]               Thank you for choosing us for your health care visit with Khadar Collier MD.  We are glad to serve you and happy to provide you with this summar Reason for Today's Visit     Ear Problem           Medical Issues Discussed Today     Ear pain, left    -  Primary      Instructions and Information about Your Health     None      Allergies as of Jun 13, 2017     No Known Allergies                Today's